# Patient Record
Sex: FEMALE | Race: AMERICAN INDIAN OR ALASKA NATIVE
[De-identification: names, ages, dates, MRNs, and addresses within clinical notes are randomized per-mention and may not be internally consistent; named-entity substitution may affect disease eponyms.]

---

## 2018-10-02 ENCOUNTER — HOSPITAL ENCOUNTER (EMERGENCY)
Dept: HOSPITAL 5 - ED | Age: 33
Discharge: HOME | End: 2018-10-02
Payer: SELF-PAY

## 2018-10-02 VITALS — DIASTOLIC BLOOD PRESSURE: 73 MMHG | SYSTOLIC BLOOD PRESSURE: 125 MMHG

## 2018-10-02 DIAGNOSIS — K59.00: Primary | ICD-10-CM

## 2018-10-02 LAB
ALBUMIN SERPL-MCNC: 4.2 G/DL (ref 3.9–5)
ALT SERPL-CCNC: < 5 UNITS/L (ref 7–56)
BASOPHILS # (AUTO): 0 K/MM3 (ref 0–0.1)
BASOPHILS NFR BLD AUTO: 0.3 % (ref 0–1.8)
BILIRUB UR QL STRIP: (no result)
BLOOD UR QL VISUAL: (no result)
BUN SERPL-MCNC: 11 MG/DL (ref 7–17)
BUN/CREAT SERPL: 14 %
CALCIUM SERPL-MCNC: 9 MG/DL (ref 8.4–10.2)
EOSINOPHIL # BLD AUTO: 0.1 K/MM3 (ref 0–0.4)
EOSINOPHIL NFR BLD AUTO: 1.1 % (ref 0–4.3)
HCT VFR BLD CALC: 37.1 % (ref 30.3–42.9)
HEMOLYSIS INDEX: 5
HGB BLD-MCNC: 12 GM/DL (ref 10.1–14.3)
LYMPHOCYTES # BLD AUTO: 1.7 K/MM3 (ref 1.2–5.4)
LYMPHOCYTES NFR BLD AUTO: 30.6 % (ref 13.4–35)
MCH RBC QN AUTO: 27 PG (ref 28–32)
MCHC RBC AUTO-ENTMCNC: 33 % (ref 30–34)
MCV RBC AUTO: 83 FL (ref 79–97)
MONOCYTES # (AUTO): 0.4 K/MM3 (ref 0–0.8)
MONOCYTES % (AUTO): 7.9 % (ref 0–7.3)
MUCOUS THREADS #/AREA URNS HPF: (no result) /HPF
PH UR STRIP: 7 [PH] (ref 5–7)
PLATELET # BLD: 181 K/MM3 (ref 140–440)
PROT UR STRIP-MCNC: (no result) MG/DL
RBC # BLD AUTO: 4.44 M/MM3 (ref 3.65–5.03)
RBC #/AREA URNS HPF: 82 /HPF (ref 0–6)
UROBILINOGEN UR-MCNC: 4 MG/DL (ref ?–2)
WBC #/AREA URNS HPF: 4 /HPF (ref 0–6)

## 2018-10-02 PROCEDURE — 99284 EMERGENCY DEPT VISIT MOD MDM: CPT

## 2018-10-02 PROCEDURE — 80053 COMPREHEN METABOLIC PANEL: CPT

## 2018-10-02 PROCEDURE — 74019 RADEX ABDOMEN 2 VIEWS: CPT

## 2018-10-02 PROCEDURE — 85025 COMPLETE CBC W/AUTO DIFF WBC: CPT

## 2018-10-02 PROCEDURE — 87591 N.GONORRHOEAE DNA AMP PROB: CPT

## 2018-10-02 PROCEDURE — 36415 COLL VENOUS BLD VENIPUNCTURE: CPT

## 2018-10-02 PROCEDURE — 84703 CHORIONIC GONADOTROPIN ASSAY: CPT

## 2018-10-02 PROCEDURE — 81001 URINALYSIS AUTO W/SCOPE: CPT

## 2018-10-02 PROCEDURE — 87210 SMEAR WET MOUNT SALINE/INK: CPT

## 2018-10-02 NOTE — EMERGENCY DEPARTMENT REPORT
ED Abdominal Pain HPI





- General


Chief Complaint: Abdominal Pain


Stated Complaint: LOWER STOMACH PAIN EXTREME


Time Seen by Provider: 10/02/18 15:25


Source: patient


Mode of arrival: Ambulatory


Limitations: No Limitations





- History of Present Illness


MD Complaint: abdominal pain


-: days(s)


Location: suprapubic


Radiation: none


Migration to: suprapubic


Severity: moderate


Quality: cramping


Consistency: intermittent


Improves With: nothing


Worsens With: nothing


Associated Symptoms: constipation.  denies: nausea, vomiting, diarrhea, fever, 

chills, dysuria, hematemesis, hematochezia, melena, hematuria, anorexia, syncope





- Related Data


LMP (females 10-50): 3 weeks


 Previous Rx's











 Medication  Instructions  Recorded  Last Taken  Type


 


Polyethylene Glycol 3350 [Miralax 17 gm PO QDAY #30 packet 10/02/18 Unknown Rx





3350]    











 Allergies











Allergy/AdvReac Type Severity Reaction Status Date / Time


 


No Known Allergies Allergy   Unverified 10/02/18 15:50














ED Review of Systems


ROS: 


Stated complaint: LOWER STOMACH PAIN EXTREME


Other details as noted in HPI





Comment: All other systems reviewed and negative


Constitutional: denies: chills


Eyes: denies: eye pain


ENT: denies: ear pain, throat pain


Respiratory: see HPI.  denies: cough, orthopnea


Cardiovascular: denies: chest pain, palpitations, dyspnea on exertion, orthopnea


Endocrine: denies: excessive sweating, flushing, intolerance to cold, 

intolerance to heat


Gastrointestinal: abdominal pain, constipation, other (PELVIC PRESSURE).  denies

: nausea, vomiting, diarrhea, hematemesis, melena, hematochezia


Genitourinary: denies: urgency, dysuria, frequency, hematuria, discharge, 

abnormal menses, dyspareunia


Musculoskeletal: denies: back pain


Skin: denies: rash, lesions


Neurological: denies: headache, weakness


Psychiatric: denies: anxiety, depression


Hematological/Lymphatic: denies: easy bleeding





ED Past Medical Hx





- Past Medical History


Previous Medical History?: No





- Surgical History


Past Surgical History?: No





- Family History


Family history: no significant





- Social History


Smoking Status: Never Smoker


Substance Use Type: None





- Medications


Home Medications: 


 Home Medications











 Medication  Instructions  Recorded  Confirmed  Last Taken  Type


 


Polyethylene Glycol 3350 [Miralax 17 gm PO QDAY #30 packet 10/02/18  Unknown Rx





3350]     














ED Physical Exam





- General


Limitations: No Limitations


General appearance: alert





- Head


Head exam: Present: atraumatic





- Eye


Eye exam: Present: PERRL





- ENT


ENT exam: Present: normal exam, mucous membranes moist





- Neck


Neck exam: Present: normal inspection





- Respiratory


Respiratory exam: Present: normal lung sounds bilaterally





- Cardiovascular


Cardiovascular Exam: Present: regular rate





- GI/Abdominal


GI/Abdominal exam: Present: soft, normal bowel sounds.  Absent: distended, 

tenderness, guarding, rebound, rigid, hyperactive bowel sounds, hypoactive 

bowel sounds, organomegaly, mass, bruit, pulsatile mass, hernia





- Rectal


Rectal exam: Present: deferred





- 


External exam: Present: normal external exam





- Extremities Exam


Extremities exam: Present: normal inspection, full ROM, normal capillary 

refill.  Absent: tenderness





- Back Exam


Back exam: Present: normal inspection, full ROM.  Absent: tenderness, CVA 

tenderness (R), CVA tenderness (L)





- Neurological Exam


Neurological exam: Present: alert, oriented X3, CN II-XII intact, normal gait, 

reflexes normal





- Psychiatric


Psychiatric exam: Present: normal affect, normal mood





- Skin


Skin exam: Present: warm, dry, intact, normal color.  Absent: rash





ED Course


 Vital Signs











  10/02/18





  15:46


 


Temperature 98.5 F


 


Pulse Rate 69


 


Respiratory 16





Rate 


 


Blood Pressure 125/73


 


O2 Sat by Pulse 100





Oximetry 














ED Medical Decision Making





- Lab Data


Result diagrams: 


 10/02/18 16:05





 10/02/18 16:05





- Radiology Data


Radiology results: report reviewed, image reviewed





- Medical Decision Making





TOOK 2 DULCOLOX PTA


HAD STOOL AND FELT BETTER








- Differential Diagnosis


RO UTI; STI; CONSTIPATION; PREGNANCY


Critical care attestation.: 


If time is entered above; I have spent that time in minutes in the direct care 

of this critically ill patient, excluding procedure time.








ED Disposition


Clinical Impression: 


 Constipation





Disposition: DC-01 TO HOME OR SELFCARE


Is pt being admited?: No


Does the pt Need Aspirin: No


Condition: Stable


Instructions:  Constipation (ED), High Fiber Diet (ED)


Additional Instructions: 


HYDRATE WELL





HIGH FIBER DIET





ACTIVITY AS TOLERATED





DAILY MIRALAX





FOLLOW UP PCP 


REFERRAL PROVIDED HERE


Prescriptions: 


Polyethylene Glycol 3350 [Miralax 3350] 17 gm PO QDAY #30 packet


Referrals: 


ROSE MEJIA MD [Primary Care Provider] - 3-5 Days


ANKUSH RUSSELL MD [Staff Physician] - 3-5 Days


Time of Disposition: 18:29

## 2018-10-02 NOTE — XRAY REPORT
FINAL REPORT



EXAM:  XR ABDOMEN 2V



HISTORY:  PELVIC PRESSURE 



TECHNIQUE:  Frontal views of the abdomen and pelvis in the supine

and upright positions 



Comparison: None 



FINDINGS:  

The bowel gas pattern is nonobstructive with air in mildly

distended loops of small bowel and colon. 



There is a mild-to-moderate amount of stool throughout the colon.





There is no evidence of pneumoperitoneum nor organomegaly. 



The bony structures are unremarkable. 



IMPRESSION:  

1. No plain film evidence of an acute intra-abdominal process.



If further imaging is required, CT may be helpful.



2. Mild-to-moderate amount of stool throughout the colon.

## 2019-02-19 ENCOUNTER — HOSPITAL ENCOUNTER (EMERGENCY)
Dept: HOSPITAL 5 - ED | Age: 34
Discharge: LEFT BEFORE DISCHARGE | End: 2019-02-19
Payer: COMMERCIAL

## 2019-02-19 VITALS — DIASTOLIC BLOOD PRESSURE: 62 MMHG | SYSTOLIC BLOOD PRESSURE: 122 MMHG

## 2019-02-19 DIAGNOSIS — M54.2: Primary | ICD-10-CM
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
## 2019-02-19 DIAGNOSIS — R51: ICD-10-CM

## 2019-02-19 DIAGNOSIS — Z53.21: ICD-10-CM
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
## 2019-02-22 ENCOUNTER — HOSPITAL ENCOUNTER (EMERGENCY)
Dept: HOSPITAL 5 - ED | Age: 34
Discharge: HOME | End: 2019-02-22
Payer: COMMERCIAL

## 2019-02-22 VITALS — SYSTOLIC BLOOD PRESSURE: 129 MMHG | DIASTOLIC BLOOD PRESSURE: 75 MMHG
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
## 2019-02-22 DIAGNOSIS — S46.002A: ICD-10-CM
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
## 2019-02-22 DIAGNOSIS — S16.1XXA: Primary | ICD-10-CM
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
## 2019-02-22 PROCEDURE — 81025 URINE PREGNANCY TEST: CPT

## 2019-02-22 PROCEDURE — 72040 X-RAY EXAM NECK SPINE 2-3 VW: CPT

## 2019-02-22 NOTE — XRAY REPORT
CERVICAL SPINE, 3 views:



History: Pain.



AP and lateral views of the cervical spine were obtained.  There

is anatomic alignment, and the disc spaces are well maintained.  There 

is no evidence of fracture or subluxation.  There is loss of the normal 

cervical lordotic curve suggestive of muscle spasm.  The prevertebral 

soft tissues are within normal limits.



IMPRESSION:

Loss of cervical lordosis suggesting muscle spasm vs. variation

in patient positioning.  Clinical correlation is advised.  Otherwise 

negative cervical spine.
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
## 2019-02-22 NOTE — XRAY REPORT
LEFT SHOULDER:



History: Pain.



Routine views demonstrate normal bony and soft tissue structures

with normal joint alignment of the shoulder.



IMPRESSION:

Normal study.

## 2019-12-05 ENCOUNTER — HOSPITAL ENCOUNTER (EMERGENCY)
Dept: HOSPITAL 5 - ED | Age: 34
Discharge: LEFT BEFORE DISCHARGE | End: 2019-12-05
Payer: SELF-PAY

## 2019-12-05 ENCOUNTER — HOSPITAL ENCOUNTER (EMERGENCY)
Dept: HOSPITAL 5 - ED | Age: 34
LOS: 1 days | Discharge: HOME | End: 2019-12-06
Payer: SELF-PAY

## 2019-12-05 VITALS — DIASTOLIC BLOOD PRESSURE: 89 MMHG | SYSTOLIC BLOOD PRESSURE: 122 MMHG

## 2019-12-05 DIAGNOSIS — Z53.21: ICD-10-CM

## 2019-12-05 DIAGNOSIS — N76.0: Primary | ICD-10-CM

## 2019-12-05 DIAGNOSIS — R42: Primary | ICD-10-CM

## 2019-12-05 DIAGNOSIS — Z79.899: ICD-10-CM

## 2019-12-05 LAB
ALBUMIN SERPL-MCNC: 4.2 G/DL (ref 3.9–5)
ALT SERPL-CCNC: 8 UNITS/L (ref 7–56)
BACTERIA #/AREA URNS HPF: (no result) /HPF
BAND NEUTROPHILS # (MANUAL): 0 K/MM3
BILIRUB UR QL STRIP: (no result)
BLOOD UR QL VISUAL: (no result)
BUN SERPL-MCNC: 7 MG/DL (ref 7–17)
BUN/CREAT SERPL: 10 %
CALCIUM SERPL-MCNC: 9 MG/DL (ref 8.4–10.2)
HCT VFR BLD CALC: 36.5 % (ref 30.3–42.9)
HEMOLYSIS INDEX: 8
HGB BLD-MCNC: 11.7 GM/DL (ref 10.1–14.3)
MCHC RBC AUTO-ENTMCNC: 32 % (ref 30–34)
MCV RBC AUTO: 85 FL (ref 79–97)
MUCOUS THREADS #/AREA URNS HPF: (no result) /HPF
MYELOCYTES # (MANUAL): 0 K/MM3
PH UR STRIP: 7 [PH] (ref 5–7)
PLATELET # BLD: 195 K/MM3 (ref 140–440)
PROMYELOCYTES # (MANUAL): 0 K/MM3
PROT UR STRIP-MCNC: (no result) MG/DL
RBC # BLD AUTO: 4.29 M/MM3 (ref 3.65–5.03)
RBC #/AREA URNS HPF: 3 /HPF (ref 0–6)
TOTAL CELLS COUNTED BLD: 100
UROBILINOGEN UR-MCNC: 2 MG/DL (ref ?–2)
WBC #/AREA URNS HPF: 1 /HPF (ref 0–6)

## 2019-12-05 PROCEDURE — 85025 COMPLETE CBC W/AUTO DIFF WBC: CPT

## 2019-12-05 PROCEDURE — 87591 N.GONORRHOEAE DNA AMP PROB: CPT

## 2019-12-05 PROCEDURE — 80053 COMPREHEN METABOLIC PANEL: CPT

## 2019-12-05 PROCEDURE — 85007 BL SMEAR W/DIFF WBC COUNT: CPT

## 2019-12-05 PROCEDURE — 76856 US EXAM PELVIC COMPLETE: CPT

## 2019-12-05 PROCEDURE — 87210 SMEAR WET MOUNT SALINE/INK: CPT

## 2019-12-05 PROCEDURE — 81001 URINALYSIS AUTO W/SCOPE: CPT

## 2019-12-05 PROCEDURE — 84703 CHORIONIC GONADOTROPIN ASSAY: CPT

## 2019-12-05 PROCEDURE — 36415 COLL VENOUS BLD VENIPUNCTURE: CPT

## 2019-12-05 PROCEDURE — 83690 ASSAY OF LIPASE: CPT

## 2019-12-06 VITALS — DIASTOLIC BLOOD PRESSURE: 63 MMHG | SYSTOLIC BLOOD PRESSURE: 112 MMHG

## 2019-12-06 NOTE — ULTRASOUND REPORT
Pelvic ultrasound with Doppler



INDICATION: Left lower abdominal and pelvic pain



FINDINGS: Uterus measures 12 x 3 x 5 cm. Endometrial thickness is normal at 8 mm. Both ovaries are wi
thin normal limits in normal Doppler flow



IMPRESSION: No acute findings.



Signer Name: Stewart Eli MD 

Signed: 12/6/2019 2:17 AM

 Workstation Name: [id number]

## 2019-12-06 NOTE — EMERGENCY DEPARTMENT REPORT
ED Female  HPI





- General


Chief complaint: Abdominal Pain


Stated complaint: ABD PAIN, DIZZINES, SOB, NUMB LEGS BACK AND BREAST


Source: patient


Mode of arrival: Ambulatory


Limitations: No Limitations





- History of Present Illness


Initial comments: 





Patient is a  A0 34-year-old -American female with no past medical 

history who presents to the ED with complaint of acute onset persistent pelvic 

pain that radiates to the left lower quadrant with this dysuria, urinary fr

equency and urgency and vaginal discharge for 1 month.  Patient states that she 

had been using Monistat thinking this would be able to help control her 

symptoms.  Patient denies vaginal bleeding, dyspareunia, abdominal pain, nausea,

vomiting, diarrhea, chest pain, shortness of breath, fever and chills.


MD Complaint: vaginal discharge, pelvic pain


-: Gradual, month(s) (1)


Location: suprapubic


Radiation: non-radiating


Severity: moderate


Severity scale (0 -10): 4


Quality: aching


Consistency: constant


Improves with: none


Worsens with: movement


Are you Pregnant Now?: No


Last Menstrual Period: 19


EDC: 20


Associated Symptoms: denies other symptoms, vaginal discharge, abdominal pain.  

denies: vaginal bleeding, nausea/vomiting, fever/chills, headaches, loss of 

appetite, dysuria, rash, seizure, shortness of breath, syncope, weakness





- Related Data


                                  Previous Rx's











 Medication  Instructions  Recorded  Last Taken  Type


 


Polyethylene Glycol 3350 [Miralax 17 gm PO QDAY #30 packet 10/02/18 Unknown Rx





3350]    


 


HYDROcodone/APAP 5-325 [[place] 1 each PO Q6HR PRN #15 tablet 19 Unknown Rx





5/325]    


 


Ibuprofen [Motrin] 800 mg PO Q8HR PRN #20 tablet 19 Unknown Rx


 


methOCARBAMOL [Robaxin TAB] 500 mg PO Q6H PRN #15 tablet 19 Unknown Rx


 


Ibuprofen [Motrin] 800 mg PO Q8HR PRN #24 tablet 19 Unknown Rx


 


metroNIDAZOLE [Flagyl] 500 mg PO Q12HR #14 tab 19 Unknown Rx











                                    Allergies











Allergy/AdvReac Type Severity Reaction Status Date / Time


 


No Known Allergies Allergy   Verified 19 20:49














ED Review of Systems


ROS: 


Stated complaint: ABD PAIN, DIZZINES, SOB, NUMB LEGS BACK AND BREAST


Other details as noted in HPI





Constitutional: denies: chills, fever


Eyes: denies: eye pain, eye discharge, vision change


ENT: denies: ear pain, throat pain


Respiratory: denies: cough, shortness of breath, wheezing


Cardiovascular: denies: chest pain, palpitations


Endocrine: no symptoms reported


Gastrointestinal: denies: abdominal pain, nausea, diarrhea


Genitourinary: urgency, dysuria, frequency, discharge, other


Musculoskeletal: denies: back pain, joint swelling, arthralgia


Skin: denies: rash, lesions


Neurological: denies: headache, weakness, paresthesias


Psychiatric: denies: anxiety, depression


Hematological/Lymphatic: denies: easy bleeding, easy bruising





ED Past Medical Hx





- Past Medical History


Previous Medical History?: No





- Surgical History


Past Surgical History?: Yes


Additional Surgical History:  x2





- Social History


Smoking Status: Never Smoker


Substance Use Type: None





- Medications


Home Medications: 


                                Home Medications











 Medication  Instructions  Recorded  Confirmed  Last Taken  Type


 


Polyethylene Glycol 3350 [Miralax 17 gm PO QDAY #30 packet 10/02/18  Unknown Rx





3350]     


 


HYDROcodone/APAP 5-325 [[place] 1 each PO Q6HR PRN #15 tablet 19  Unknown Rx





5/325]     


 


Ibuprofen [Motrin] 800 mg PO Q8HR PRN #20 tablet 19  Unknown Rx


 


methOCARBAMOL [Robaxin TAB] 500 mg PO Q6H PRN #15 tablet 19  Unknown Rx


 


Ibuprofen [Motrin] 800 mg PO Q8HR PRN #24 tablet 19  Unknown Rx


 


metroNIDAZOLE [Flagyl] 500 mg PO Q12HR #14 tab 19  Unknown Rx














ED Physical Exam





- General


Limitations: No Limitations


General appearance: alert, in no apparent distress





- Head


Head exam: Present: atraumatic, normocephalic, normal inspection





- Eye


Eye exam: Present: normal appearance, PERRL, EOMI


Pupils: Present: normal accommodation





- ENT


ENT exam: Present: normal exam, normal orophraynx, mucous membranes moist, TM's 

normal bilaterally, normal external ear exam





- Neck


Neck exam: Present: normal inspection, full ROM.  Absent: tenderness, 

meningismus





- Respiratory


Respiratory exam: Present: normal lung sounds bilaterally.  Absent: respiratory 

distress, wheezes, rales, rhonchi, chest wall tenderness, decreased breath 

sounds





- Cardiovascular


Cardiovascular Exam: Present: regular rate, normal rhythm, normal heart sounds. 

Absent: systolic murmur, diastolic murmur, rubs, gallop





- GI/Abdominal


GI/Abdominal exam: Present: soft, normal bowel sounds.  Absent: tenderness, 

guarding, hyperactive bowel sounds, hypoactive bowel sounds, organomegaly





- 


External exam: Present: normal external exam


Speculum exam: Present: vaginal discharge, cervical discharge


Bi-manual exam: Present: normal bi-manual exam, other (Female RN present during 

pelvic exam as a chaperone).  Absent: cervical motion tendernes, adnexal 

tenderness, uterine tenderness





- Extremities Exam


Extremities exam: Present: normal inspection, full ROM, normal capillary refill





- Back Exam


Back exam: Present: normal inspection, full ROM





- Neurological Exam


Neurological exam: Present: alert, oriented X3, CN II-XII intact, normal gait, 

reflexes normal





- Psychiatric


Psychiatric exam: Present: normal affect, normal mood





- Skin


Skin exam: Present: warm, dry, intact, normal color.  Absent: rash





ED Course





                                   Vital Signs











  19





  20:53


 


Temperature 98.5 F


 


Pulse Rate 62


 


Respiratory 14





Rate 


 


Blood Pressure 112/72


 


O2 Sat by Pulse 100





Oximetry 














ED Medical Decision Making





- Lab Data


Result diagrams: 


                                 19 21:29





                                 19 21:29





- Radiology Data


Radiology results: report reviewed, image reviewed





Findings


[address] [address]





Ultrasound Report


Signed





Patient: SUSAN SMITH MR#: [id number]


[id number]


: 1985 Acct:[id number]





Age/Sex: 34 / F ADM Date: 19





Loc: ED


Attending Dr:








Ordering Physician: MAY INIGUEZ


Date of Service: 19


Procedure(s): US pelvic complete


Accession Number(s): [id number]





cc: MAY INIGUEZ








Pelvic ultrasound with Doppler





INDICATION: Left lower abdominal and pelvic pain





FINDINGS: Uterus measures 12 x 3 x 5 cm. Endometrial thickness is normal at 8 

mm. Both ovaries are


within normal limits in normal Doppler flow





IMPRESSION: No acute findings.





Signer Name: Stewart Eli MD


Signed: 2019 2:17 AM


Workstation Name: [id number]








Transcribed By: BC


Dictated By: Stewart Eli MD


Electronically Authenticated By: Stewart Eli MD


Signed Date/Time: 19











DD/DT: 19





- Medical Decision Making





This is a 34-year-old female who presented to the ED with pelvic pain with 

vaginal discharge for 1 month.  In the ED, patient is alert and oriented 3 and 

is not in distress.  Lab test results were reviewed and are nonactionable inc

luding urinalysis.  Pelvic exam is unremarkable except for thick yellowish 

vaginal discharge.  Pelvic ultrasound shows uterus measuring 12 x 3 x 5 cm. 

Endometrial thickness is normal at 8 mm. Both ovaries are within normal limits 

in normal Doppler flow.  Wet prep tests showed significant Gardnerella vaginalis

but negative for Trichomonas and yeast.  Patient was discharged home on 

medications for bacterial vaginosis and pain medications and was advised to 

follow-up with OB/GYN physician intervention days for reevaluation.  Patient was

advised to return to the ED immediately if symptoms get worse.








- Differential Diagnosis


UTI; Ovarian cysts; Ovarian torsion; STD; PID; Kidney stones


Critical care attestation.: 


If time is entered above; I have spent that time in minutes in the direct care 

of this critically ill patient, excluding procedure time.








ED Disposition


Clinical Impression: 


 Acute pelvic pain, female, Bacterial vaginosis





Disposition: - TO HOME OR SELFCARE


Is pt being admited?: No


Condition: Stable


Instructions:  Abdominal Pain (ED), Bacterial Vaginosis (ED)


Additional Instructions: 


Take medication with food, drink plenty of fluids and follow-up with your 

primary care physician in 5-7 days for reevaluation.  Return to the ED 

immediately if symptoms get worse.


Prescriptions: 


metroNIDAZOLE [Flagyl] 500 mg PO Q12HR #14 tab


Ibuprofen [Motrin] 800 mg PO Q8HR PRN #24 tablet


 PRN Reason: Pain , Severe (7-10)


Referrals: 


PRIMARY CARE,MD [Primary Care Provider] - 3-5 Days


Forms:  STI Treatment and Prevention


Time of Disposition: 03:58


Print Language: ENGLISH